# Patient Record
Sex: FEMALE | Race: WHITE | NOT HISPANIC OR LATINO | URBAN - NONMETROPOLITAN AREA
[De-identification: names, ages, dates, MRNs, and addresses within clinical notes are randomized per-mention and may not be internally consistent; named-entity substitution may affect disease eponyms.]

---

## 2020-11-03 ENCOUNTER — WEB ENCOUNTER (OUTPATIENT)
Dept: URBAN - NONMETROPOLITAN AREA CLINIC 2 | Facility: CLINIC | Age: 45
End: 2020-11-03

## 2020-11-03 ENCOUNTER — OFFICE VISIT (OUTPATIENT)
Dept: URBAN - NONMETROPOLITAN AREA CLINIC 2 | Facility: CLINIC | Age: 45
End: 2020-11-03
Payer: COMMERCIAL

## 2020-11-03 DIAGNOSIS — D64.9 ANEMIA: ICD-10-CM

## 2020-11-03 DIAGNOSIS — Z12.11 SCREENING FOR COLON CANCER: ICD-10-CM

## 2020-11-03 DIAGNOSIS — R19.7 DIARRHEA: ICD-10-CM

## 2020-11-03 PROCEDURE — G8427 DOCREV CUR MEDS BY ELIG CLIN: HCPCS | Performed by: INTERNAL MEDICINE

## 2020-11-03 PROCEDURE — G8482 FLU IMMUNIZE ORDER/ADMIN: HCPCS | Performed by: INTERNAL MEDICINE

## 2020-11-03 PROCEDURE — G9903 PT SCRN TBCO ID AS NON USER: HCPCS | Performed by: INTERNAL MEDICINE

## 2020-11-03 PROCEDURE — 99214 OFFICE O/P EST MOD 30 MIN: CPT | Performed by: INTERNAL MEDICINE

## 2020-11-03 NOTE — HPI-TODAY'S VISIT:
The patient presents today for follow-up of her anemia and diarrhea.  Since her last visit, her diarrhea has resolved.  We do feel that her anemia is likely secondary to her heavy periods.  She has not been evaluated by OB/GYN.  She continues to have a large amount of vaginal bleeding.  She has not seen any blood in her stool.  She has been unable to get in with a GYN, and she has not had her labs rechecked.  She feels tired today, but she denies any further GI symptoms.  She did have an EGD and colonoscopy performed.  These were done while she was in the hospital.  She did not have normal biopsies and a normal colonoscopy.  Her EGD was normal with normal biopsies as well.

## 2020-11-04 LAB
BASO (ABSOLUTE): 0.1
BASOS: 1
EOS (ABSOLUTE): 0.1
EOS: 1
HEMATOCRIT: 32.4
HEMATOLOGY COMMENTS:: (no result)
HEMOGLOBIN: 10.5
IMMATURE CELLS: (no result)
IMMATURE GRANS (ABS): 0
IMMATURE GRANULOCYTES: 0
LYMPHS (ABSOLUTE): 1.4
LYMPHS: 37
MCH: 26.7
MCHC: 32.4
MCV: 82
MONOCYTES(ABSOLUTE): 0.4
MONOCYTES: 10
NEUTROPHILS (ABSOLUTE): 1.8
NEUTROPHILS: 51
NRBC: (no result)
PLATELETS: 275
RBC: 3.93
RDW: 14.4
WBC: 3.7

## 2021-05-04 ENCOUNTER — DASHBOARD ENCOUNTERS (OUTPATIENT)
Age: 46
End: 2021-05-04

## 2021-05-04 ENCOUNTER — OFFICE VISIT (OUTPATIENT)
Dept: URBAN - NONMETROPOLITAN AREA CLINIC 2 | Facility: CLINIC | Age: 46
End: 2021-05-04
Payer: COMMERCIAL

## 2021-05-04 DIAGNOSIS — R19.7 DIARRHEA: ICD-10-CM

## 2021-05-04 DIAGNOSIS — D64.9 ANEMIA: ICD-10-CM

## 2021-05-04 DIAGNOSIS — Z12.11 SCREENING FOR COLON CANCER: ICD-10-CM

## 2021-05-04 PROCEDURE — 99213 OFFICE O/P EST LOW 20 MIN: CPT | Performed by: INTERNAL MEDICINE

## 2021-05-04 NOTE — HPI-TODAY'S VISIT:
The patient presents today for follow-up of her anemia and diarrhea.  Since her last visit, her diarrhea has resolved.  We do feel that her anemia is likely secondary to her heavy periods.  She has not been evaluated by OB/GYN.  She continues to have a large amount of vaginal bleeding.  She has not seen any blood in her stool.  She has been unable to get in with a GYN, and she has not had her labs rechecked.  She feels tired today, but she denies any further GI symptoms.  She did have an EGD and colonoscopy performed.  These were done while she was in the hospital.  She did not have normal biopsies and a normal colonoscopy.  Her EGD was normal with normal biopsies as well. 5-4-21 The patient presents today for follow-up of her anemia and diarrhea.  Since her last visit, her diarrhea has resolved.  She did see her GYN, but she did not schedule an ablation.  The doctor wanted her bleeding to stop.  She has had constant uterine and vaginal bleeding.  She has not had her labs rechecked.  She did have Covid.  Today, from a GI standpoint, she is feeling well.  She does complain of occasional reflux symptoms, and she will take Prilosec.  This is usually worse at night.  She is also been evaluated by an ENT.  We have had a long discussion about her anemia.  Her EGD and colonoscopy were normal.  No further GI work-up needed at this time until her GYN issues have resolved.

## 2021-08-24 ENCOUNTER — OFFICE VISIT (OUTPATIENT)
Dept: URBAN - NONMETROPOLITAN AREA CLINIC 2 | Facility: CLINIC | Age: 46
End: 2021-08-24

## 2022-05-03 ENCOUNTER — OFFICE VISIT (OUTPATIENT)
Dept: URBAN - NONMETROPOLITAN AREA CLINIC 2 | Facility: CLINIC | Age: 47
End: 2022-05-03

## 2022-05-10 ENCOUNTER — OFFICE VISIT (OUTPATIENT)
Dept: URBAN - NONMETROPOLITAN AREA CLINIC 2 | Facility: CLINIC | Age: 47
End: 2022-05-10